# Patient Record
Sex: MALE | Race: BLACK OR AFRICAN AMERICAN | NOT HISPANIC OR LATINO | Employment: UNEMPLOYED | URBAN - METROPOLITAN AREA
[De-identification: names, ages, dates, MRNs, and addresses within clinical notes are randomized per-mention and may not be internally consistent; named-entity substitution may affect disease eponyms.]

---

## 2022-05-18 ENCOUNTER — OFFICE VISIT (OUTPATIENT)
Dept: ORTHOPEDICS | Facility: CLINIC | Age: 37
End: 2022-05-18
Payer: MEDICAID

## 2022-05-18 VITALS
HEIGHT: 73 IN | RESPIRATION RATE: 16 BRPM | DIASTOLIC BLOOD PRESSURE: 98 MMHG | BODY MASS INDEX: 41.75 KG/M2 | SYSTOLIC BLOOD PRESSURE: 148 MMHG | WEIGHT: 315 LBS | TEMPERATURE: 98 F | HEART RATE: 100 BPM | OXYGEN SATURATION: 97 %

## 2022-05-18 DIAGNOSIS — S86.819D RUPTURE OF PATELLAR TENDON, UNSPECIFIED LATERALITY, SUBSEQUENT ENCOUNTER: Primary | ICD-10-CM

## 2022-05-18 PROCEDURE — 99024 POSTOP FOLLOW-UP VISIT: CPT | Mod: ,,, | Performed by: ORTHOPAEDIC SURGERY

## 2022-05-18 PROCEDURE — 99024 PR POST-OP FOLLOW-UP VISIT: ICD-10-PCS | Mod: ,,, | Performed by: ORTHOPAEDIC SURGERY

## 2022-05-18 PROCEDURE — 99213 OFFICE O/P EST LOW 20 MIN: CPT | Mod: PBBFAC

## 2022-05-18 RX ORDER — LEVOCETIRIZINE DIHYDROCHLORIDE 5 MG/1
TABLET, FILM COATED ORAL
COMMUNITY

## 2022-05-18 NOTE — PROGRESS NOTES
[facility] Clinic Note    In brief, Josh Joshua is a 37 y.o. male status post right patellar tendon repair on 3/31.  We last saw him in clinic on 4/13 at which time we unlocked him from 0-30 and we instructed him to unlock 30 degrees every 2 weeks. Today he states that he is doing really well, he unlock his brace to 90° last week has been pretty comfortable.  Pain is very well controlled and not really having any.  He feels like he has good range of motion of the knee.      PMH:   Past Medical History:   Diagnosis Date    Hypertension        PSH:   Past Surgical History:   Procedure Laterality Date    COLOSTOMY         SH:   Social History     Socioeconomic History    Marital status: Other   Tobacco Use    Smoking status: Current Every Day Smoker     Packs/day: 0.25     Types: Cigarettes    Smokeless tobacco: Never Used   Substance and Sexual Activity    Alcohol use: Never    Drug use: Never       FH:   Family History   Problem Relation Age of Onset    Hypertension Mother     No Known Problems Sister     No Known Problems Brother        Allergies: Review of patient's allergies indicates:  No Known Allergies    ROS:  Constitutional- no fever, fatigue, weakness  Eye- no vision loss, eye redness, drainage, or pain  ENMT- no sore throat, ear pain, sinus pain/congestion, nasal congestion/drainage  Respiratory- no cough, wheezing, or shortness of breath  CV- no chest pain, no palpitations, no edema  GI- no N/V/D; no abdominal pain    Physical Exam:  Vitals:    05/18/22 1304   BP: (!) 148/98   Pulse: 100   Resp: 16   Temp: 98.1 °F (36.7 °C)       General: NAD  Cardio: RRR by peripheral pulse  Pulm: Normal WOB on room air, symmetric chest rise  Abd: Soft, NT/ND    MSK:  RLE-  Surgical incisions clean dry intact no evidence of infection  Patient actively able to range from 0 to 90  Patient is able to perform a straight leg raise  Neurovascular intact    Assessment:  37 y.o. male status post right  patellar tendon repair on 3/31    -patient unlocked 0 90 today, weight-bearing as tolerated  -I instructed him in 2 weeks to unlock to 120 at which point once he starts to feel really comfortable, he can get rid of the brace  -patient return to clinic in 6 weeks    Sanjiv Munson MD  [facility]  5/18/2022 1:16 PM

## 2022-05-18 NOTE — PROGRESS NOTES
ATTENDING ADDENDUM    Josh Joshua  was evaluated at the time of the encounter with Dr Munson PGY3.  HPI, PE and treatment plan was reviewed. Treatment plan was reasonable and necessary. Imaging was reviewed at the time of visit.